# Patient Record
Sex: FEMALE | Race: BLACK OR AFRICAN AMERICAN | NOT HISPANIC OR LATINO | Employment: UNEMPLOYED | URBAN - METROPOLITAN AREA
[De-identification: names, ages, dates, MRNs, and addresses within clinical notes are randomized per-mention and may not be internally consistent; named-entity substitution may affect disease eponyms.]

---

## 2021-12-15 ENCOUNTER — LAB VISIT (OUTPATIENT)
Dept: LAB | Facility: CLINIC | Age: 21
End: 2021-12-15
Payer: MEDICAID

## 2021-12-15 ENCOUNTER — INITIAL PRENATAL (OUTPATIENT)
Dept: OBSTETRICS AND GYNECOLOGY | Facility: CLINIC | Age: 21
End: 2021-12-15
Payer: MEDICAID

## 2021-12-15 ENCOUNTER — PROCEDURE VISIT (OUTPATIENT)
Dept: OBSTETRICS AND GYNECOLOGY | Facility: CLINIC | Age: 21
End: 2021-12-15
Payer: MEDICAID

## 2021-12-15 VITALS — WEIGHT: 268.94 LBS | DIASTOLIC BLOOD PRESSURE: 78 MMHG | SYSTOLIC BLOOD PRESSURE: 142 MMHG

## 2021-12-15 DIAGNOSIS — R63.8 INCREASED BMI: ICD-10-CM

## 2021-12-15 DIAGNOSIS — O09.32 INSUFFICIENT PRENATAL CARE IN SECOND TRIMESTER: ICD-10-CM

## 2021-12-15 DIAGNOSIS — Z32.01 POSITIVE PREGNANCY TEST: ICD-10-CM

## 2021-12-15 DIAGNOSIS — Z32.01 POSITIVE PREGNANCY TEST: Primary | ICD-10-CM

## 2021-12-15 DIAGNOSIS — Z3A.15 15 WEEKS GESTATION OF PREGNANCY: ICD-10-CM

## 2021-12-15 LAB
ABO + RH BLD: NORMAL
AMPHET+METHAMPHET UR QL: NEGATIVE
BARBITURATES UR QL SCN>200 NG/ML: NEGATIVE
BASOPHILS # BLD AUTO: 0.01 K/UL (ref 0–0.2)
BASOPHILS NFR BLD: 0.1 % (ref 0–1.9)
BENZODIAZ UR QL SCN>200 NG/ML: NEGATIVE
BLD GP AB SCN CELLS X3 SERPL QL: NORMAL
BZE UR QL SCN: NEGATIVE
CANNABINOIDS UR QL SCN: NEGATIVE
CREAT UR-MCNC: 224.8 MG/DL (ref 15–325)
DIFFERENTIAL METHOD: ABNORMAL
EOSINOPHIL # BLD AUTO: 0.1 K/UL (ref 0–0.5)
EOSINOPHIL NFR BLD: 1.3 % (ref 0–8)
ERYTHROCYTE [DISTWIDTH] IN BLOOD BY AUTOMATED COUNT: 14.2 % (ref 11.5–14.5)
HCT VFR BLD AUTO: 32.5 % (ref 37–48.5)
HGB BLD-MCNC: 10.8 G/DL (ref 12–16)
HGB S BLD QL SOLY: NEGATIVE
IMM GRANULOCYTES # BLD AUTO: 0.02 K/UL (ref 0–0.04)
IMM GRANULOCYTES NFR BLD AUTO: 0.3 % (ref 0–0.5)
LYMPHOCYTES # BLD AUTO: 2.7 K/UL (ref 1–4.8)
LYMPHOCYTES NFR BLD: 34.6 % (ref 18–48)
MCH RBC QN AUTO: 25.4 PG (ref 27–31)
MCHC RBC AUTO-ENTMCNC: 33.2 G/DL (ref 32–36)
MCV RBC AUTO: 77 FL (ref 82–98)
METHADONE UR QL SCN>300 NG/ML: NEGATIVE
MONOCYTES # BLD AUTO: 0.6 K/UL (ref 0.3–1)
MONOCYTES NFR BLD: 7.8 % (ref 4–15)
NEUTROPHILS # BLD AUTO: 4.4 K/UL (ref 1.8–7.7)
NEUTROPHILS NFR BLD: 55.9 % (ref 38–73)
NRBC BLD-RTO: 0 /100 WBC
OPIATES UR QL SCN: NEGATIVE
PCP UR QL SCN>25 NG/ML: NEGATIVE
PLATELET # BLD AUTO: 246 K/UL (ref 150–450)
PMV BLD AUTO: 8.9 FL (ref 9.2–12.9)
RBC # BLD AUTO: 4.25 M/UL (ref 4–5.4)
TOXICOLOGY INFORMATION: NORMAL
WBC # BLD AUTO: 7.85 K/UL (ref 3.9–12.7)

## 2021-12-15 PROCEDURE — 86592 SYPHILIS TEST NON-TREP QUAL: CPT | Performed by: ADVANCED PRACTICE MIDWIFE

## 2021-12-15 PROCEDURE — 99203 PR OFFICE/OUTPT VISIT, NEW, LEVL III, 30-44 MIN: ICD-10-PCS | Mod: TH,25,S$GLB, | Performed by: ADVANCED PRACTICE MIDWIFE

## 2021-12-15 PROCEDURE — 80307 DRUG TEST PRSMV CHEM ANLYZR: CPT | Performed by: ADVANCED PRACTICE MIDWIFE

## 2021-12-15 PROCEDURE — 76805 OB US >/= 14 WKS SNGL FETUS: CPT | Mod: S$GLB,,, | Performed by: OBSTETRICS & GYNECOLOGY

## 2021-12-15 PROCEDURE — 85025 COMPLETE CBC W/AUTO DIFF WBC: CPT | Performed by: ADVANCED PRACTICE MIDWIFE

## 2021-12-15 PROCEDURE — 76805 US OB/GYN PROCEDURE (VIEWPOINT): ICD-10-PCS | Mod: S$GLB,,, | Performed by: OBSTETRICS & GYNECOLOGY

## 2021-12-15 PROCEDURE — 36415 PR COLLECTION VENOUS BLOOD,VENIPUNCTURE: ICD-10-PCS | Mod: ,,, | Performed by: ADVANCED PRACTICE MIDWIFE

## 2021-12-15 PROCEDURE — 87491 CHLMYD TRACH DNA AMP PROBE: CPT | Performed by: ADVANCED PRACTICE MIDWIFE

## 2021-12-15 PROCEDURE — 99203 OFFICE O/P NEW LOW 30 MIN: CPT | Mod: TH,25,S$GLB, | Performed by: ADVANCED PRACTICE MIDWIFE

## 2021-12-15 PROCEDURE — 87086 URINE CULTURE/COLONY COUNT: CPT | Performed by: ADVANCED PRACTICE MIDWIFE

## 2021-12-15 PROCEDURE — 87591 N.GONORRHOEAE DNA AMP PROB: CPT | Performed by: ADVANCED PRACTICE MIDWIFE

## 2021-12-15 PROCEDURE — 86762 RUBELLA ANTIBODY: CPT | Performed by: ADVANCED PRACTICE MIDWIFE

## 2021-12-15 PROCEDURE — 80074 ACUTE HEPATITIS PANEL: CPT | Performed by: ADVANCED PRACTICE MIDWIFE

## 2021-12-15 PROCEDURE — 86901 BLOOD TYPING SEROLOGIC RH(D): CPT | Performed by: ADVANCED PRACTICE MIDWIFE

## 2021-12-15 PROCEDURE — 87389 HIV-1 AG W/HIV-1&-2 AB AG IA: CPT | Performed by: ADVANCED PRACTICE MIDWIFE

## 2021-12-15 PROCEDURE — 36415 COLL VENOUS BLD VENIPUNCTURE: CPT | Mod: ,,, | Performed by: ADVANCED PRACTICE MIDWIFE

## 2021-12-15 PROCEDURE — 85660 RBC SICKLE CELL TEST: CPT | Performed by: ADVANCED PRACTICE MIDWIFE

## 2021-12-16 LAB
HAV IGM SERPL QL IA: NEGATIVE
HBV CORE IGM SERPL QL IA: NEGATIVE
HBV SURFACE AG SERPL QL IA: NEGATIVE
HCV AB SERPL QL IA: NEGATIVE
HIV 1+2 AB+HIV1 P24 AG SERPL QL IA: NEGATIVE
RPR SER QL: NORMAL
RUBV IGG SER-ACNC: 26.1 IU/ML
RUBV IGG SER-IMP: REACTIVE

## 2021-12-17 LAB — BACTERIA UR CULT: NO GROWTH

## 2021-12-22 LAB
C TRACH DNA SPEC QL NAA+PROBE: NOT DETECTED
N GONORRHOEA DNA SPEC QL NAA+PROBE: NOT DETECTED

## 2022-01-04 ENCOUNTER — ROUTINE PRENATAL (OUTPATIENT)
Dept: OBSTETRICS AND GYNECOLOGY | Facility: CLINIC | Age: 22
End: 2022-01-04
Payer: MEDICAID

## 2022-01-04 VITALS — SYSTOLIC BLOOD PRESSURE: 140 MMHG | DIASTOLIC BLOOD PRESSURE: 78 MMHG | WEIGHT: 265.88 LBS

## 2022-01-04 DIAGNOSIS — Z3A.18 18 WEEKS GESTATION OF PREGNANCY: Primary | ICD-10-CM

## 2022-01-04 DIAGNOSIS — O09.32 INSUFFICIENT PRENATAL CARE IN SECOND TRIMESTER: ICD-10-CM

## 2022-01-04 PROBLEM — Z3A.15 15 WEEKS GESTATION OF PREGNANCY: Status: RESOLVED | Noted: 2021-12-15 | Resolved: 2022-01-04

## 2022-01-04 PROCEDURE — 99213 OFFICE O/P EST LOW 20 MIN: CPT | Mod: TH,S$GLB,, | Performed by: ADVANCED PRACTICE MIDWIFE

## 2022-01-04 PROCEDURE — 99213 PR OFFICE/OUTPT VISIT, EST, LEVL III, 20-29 MIN: ICD-10-PCS | Mod: TH,S$GLB,, | Performed by: ADVANCED PRACTICE MIDWIFE

## 2022-01-04 NOTE — PROGRESS NOTES
2022  21 y.o. 18w2d  Estimated Date of Delivery: 22, EDC per patient (US at [facility]). US requested, not received. US in our office at 15 + 3/7 weeks c/w stated EDC.  OB History    Para Term  AB Living   1             SAB IAB Ectopic Multiple Live Births                  # Outcome Date GA Lbr Ron/2nd Weight Sex Delivery Anes PTL Lv   1 Current                Here for scheduled KURTIS visit. Doing well.  No lof/brvb, dysuria, fever/chills, nausea or emesis. No S&S of pre eclampsia or COIVD 19. Denies quickening. No cramps/contractions. Calm, pleasant, NAD. ROS negative with exception of aforementioned:     History  OBXHX:  Insufficient prenatal care  Increased BMI  PMHX:  Denies major illness injury  SURGHX:  Denies  NKDA  MEDS:  None  SOCHX:  Former smoker-quit first trimester  Denies etoh or tobacco use in pregnancy.       LABS:  B pos abs neg  HIV neg  Rubella immune  RPR non reactive  HEP A B C neg  GC CHL neg/neg  Urine cx no growth  CBC 10.8/32.5  Sickle Screen neg  PAP-plan PP      Review of Systems:  General ROS: negative for headache or visual changes  Breast ROS: negative for breast lumps  Gastrointestinal ROS: negative for constipation, diarrhea or nausea/vomiting  Musculoskeletal ROS: negative for pain in joints or swelling in face or hands.   Neurological ROS: negative for - headaches, numbness/tingling or visual changes    Physical Exam:  BP (!) 140/78   Wt 120.6 kg (265 lb 14 oz)   LMP 2021   Urine Dip:   FHT:   160s  Constitutional: She is oriented to person, place, and time. She appears well-developed and well-nourished. No distress.   Pulmonary/Chest: Effort normal. No respiratory distress  Abdominal: Soft, gravid, nontender. No rebound and no guarding. Fundal Height:  1 below U, cwd.   Genitourinary: Deferred   Musculoskeletal: Normal range of motion. Minimal peripheral edema.   Neurological: She is alert and oriented to person, place, and time.  Coordination normal. Gait smooth and steady  Skin: Skin is warm and dry. She is not diaphoretic.  Psychiatric: She has a normal mood and affect.      Assessment:   21 y.o., at 18w2d Gestation   Patient Active Problem List   Diagnosis    Insufficient prenatal care in second trimester    Increased BMI    18 weeks gestation of pregnancy     No current outpatient medications on file prior to visit.     No current facility-administered medications on file prior to visit.       Plan:  1. S&S of SAB reinforced.  2. RX for PNV sent to pharmacy of choice. Discussed may take OTC PNV if not covered by insurance provider.  3. All prenatal labs reviewed and discussed.  4. Dating US discussed, cwd. Keep EDC 6/05/22.  5. Quad Screen at next visit, discussed. No  here at office today.  6. Anatomy US and KURTIS in 2 weeks.

## 2022-01-21 ENCOUNTER — LAB VISIT (OUTPATIENT)
Dept: LAB | Facility: CLINIC | Age: 22
End: 2022-01-21
Payer: MEDICAID

## 2022-01-21 ENCOUNTER — ROUTINE PRENATAL (OUTPATIENT)
Dept: OBSTETRICS AND GYNECOLOGY | Facility: CLINIC | Age: 22
End: 2022-01-21
Payer: MEDICAID

## 2022-01-21 ENCOUNTER — PROCEDURE VISIT (OUTPATIENT)
Dept: OBSTETRICS AND GYNECOLOGY | Facility: CLINIC | Age: 22
End: 2022-01-21
Payer: MEDICAID

## 2022-01-21 VITALS — DIASTOLIC BLOOD PRESSURE: 68 MMHG | WEIGHT: 265.88 LBS | SYSTOLIC BLOOD PRESSURE: 120 MMHG

## 2022-01-21 DIAGNOSIS — R63.8 INCREASED BMI: ICD-10-CM

## 2022-01-21 DIAGNOSIS — O09.32 INSUFFICIENT PRENATAL CARE IN SECOND TRIMESTER: Primary | ICD-10-CM

## 2022-01-21 DIAGNOSIS — Z3A.20 20 WEEKS GESTATION OF PREGNANCY: ICD-10-CM

## 2022-01-21 PROBLEM — Z3A.18 18 WEEKS GESTATION OF PREGNANCY: Status: RESOLVED | Noted: 2022-01-04 | Resolved: 2022-01-21

## 2022-01-21 PROCEDURE — 76805 OB US >/= 14 WKS SNGL FETUS: CPT | Mod: S$GLB,,, | Performed by: OBSTETRICS & GYNECOLOGY

## 2022-01-21 PROCEDURE — 36415 PR COLLECTION VENOUS BLOOD,VENIPUNCTURE: ICD-10-PCS | Mod: ,,, | Performed by: ADVANCED PRACTICE MIDWIFE

## 2022-01-21 PROCEDURE — 36415 COLL VENOUS BLD VENIPUNCTURE: CPT | Mod: ,,, | Performed by: ADVANCED PRACTICE MIDWIFE

## 2022-01-21 PROCEDURE — 99213 OFFICE O/P EST LOW 20 MIN: CPT | Mod: TH,S$GLB,, | Performed by: ADVANCED PRACTICE MIDWIFE

## 2022-01-21 PROCEDURE — 99213 PR OFFICE/OUTPT VISIT, EST, LEVL III, 20-29 MIN: ICD-10-PCS | Mod: TH,S$GLB,, | Performed by: ADVANCED PRACTICE MIDWIFE

## 2022-01-21 PROCEDURE — 76805 US OB/GYN PROCEDURE (VIEWPOINT): ICD-10-PCS | Mod: S$GLB,,, | Performed by: OBSTETRICS & GYNECOLOGY

## 2022-01-21 PROCEDURE — 81511 FTL CGEN ABNOR FOUR ANAL: CPT | Performed by: ADVANCED PRACTICE MIDWIFE

## 2022-01-21 NOTE — PROGRESS NOTES
2022  21 y.o. 20 + 5/7  Estimated Date of Delivery: 22, EDC per patient (US at [facility]). US requested, not received. US in our office at 15 + 3/7 weeks c/w stated EDC.  OB History    Para Term  AB Living   1             SAB IAB Ectopic Multiple Live Births                  # Outcome Date GA Lbr Ron/2nd Weight Sex Delivery Anes PTL Lv   1 Current                Here for scheduled KURTIS visit. Doing well.  No lof/brvb, dysuria, fever/chills, nausea or emesis. No S&S of  COIVD 19. + FM noted. No cramps/contractions. Calm, pleasant, NAD. ROS negative with exception of aforementioned:     History  OBXHX:  Insufficient prenatal care  Increased BMI  PMHX:  Denies major illness injury  SURGHX:  Denies  NKDA  MEDS:  None  SOCHX:  Former smoker-quit first trimester  Denies etoh or tobacco use in pregnancy.     LABS:  B pos abs neg  HIV neg  Rubella immune  RPR non reactive  HEP A B C neg  GC CHL neg/neg  Urine cx no growth  CBC 10.8/32.5  Sickle Screen neg  PAP-plan PP      Review of Systems:  General ROS: negative for headache or visual changes  Breast ROS: negative for breast lumps  Gastrointestinal ROS: negative for constipation, diarrhea or nausea/vomiting  Musculoskeletal ROS: negative for pain in joints or swelling in face or hands.   Neurological ROS: negative for - headaches, numbness/tingling or visual changes    Physical Exam:  /68   Wt 120.6 kg (265 lb 14 oz)   LMP 2021   Urine Dip: neg/neg  FHT:   150s-160s  Constitutional: She is oriented to person, place, and time. She appears well-developed and well-nourished. No distress.   Pulmonary/Chest: Effort normal. No respiratory distress  Abdominal: Soft, gravid, nontender. No rebound and no guarding. Fundal Height:  1 above U.  Genitourinary: Deferred   Musculoskeletal: Normal range of motion. Minimal peripheral edema.   Neurological: She is alert and oriented to person, place, and time. Coordination normal. Gait  smooth and steady  Skin: Skin is warm and dry. She is not diaphoretic.  Psychiatric: She has a normal mood and affect.      Assessment:   21 y.o., at 20 + 5/7 Gestation   Patient Active Problem List   Diagnosis    Insufficient prenatal care in second trimester    Increased BMI    20 weeks gestation of pregnancy     Current Outpatient Medications on File Prior to Visit   Medication Sig Dispense Refill    PNV,calcium 72-iron-folic acid (PRENATAL VITAMIN PLUS LOW IRON) 27 mg iron- 1 mg Tab Take 1 tablet (1 each total) by mouth once daily.  3     No current facility-administered medications on file prior to visit.       Plan:  1. S&S of SAB reinforced.  2. Continue daily PNV.  3.   Quad Screen today/discussed.  4.  US for anatomy today.  5.  KURTIS 3 weeks.

## 2022-01-24 LAB
# FETUSES US: NORMAL
2ND TRIMESTER 4 SCREEN PNL SERPL: NEGATIVE
2ND TRIMESTER 4 SCREEN SERPL-IMP: NORMAL
AFP MOM SERPL: 1.54
AFP SERPL-MCNC: 81.5 NG/ML
AGE AT DELIVERY: 23
B-HCG MOM SERPL: 1.29
B-HCG SERPL-ACNC: 22.9 IU/ML
FET TS 21 RISK FROM MAT AGE: NORMAL
GA (DAYS): 5 D
GA (WEEKS): 20 WK
GA METHOD: NORMAL
IDDM PATIENT QL: NORMAL
INHIBIN A MOM SERPL: 1.19
INHIBIN A SERPL-MCNC: 147.1 PG/ML
SMOKING STATUS FTND: NO
TS 18 RISK FETUS: NORMAL
TS 21 RISK FETUS: NORMAL
U ESTRIOL MOM SERPL: 0.51
U ESTRIOL SERPL-MCNC: 0.99 NG/ML

## 2022-02-11 ENCOUNTER — ROUTINE PRENATAL (OUTPATIENT)
Dept: OBSTETRICS AND GYNECOLOGY | Facility: CLINIC | Age: 22
End: 2022-02-11
Payer: MEDICAID

## 2022-02-11 VITALS — SYSTOLIC BLOOD PRESSURE: 130 MMHG | WEIGHT: 266.13 LBS | DIASTOLIC BLOOD PRESSURE: 64 MMHG

## 2022-02-11 DIAGNOSIS — Z3A.23 23 WEEKS GESTATION OF PREGNANCY: Primary | ICD-10-CM

## 2022-02-11 DIAGNOSIS — Z3A.26 26 WEEKS GESTATION OF PREGNANCY: ICD-10-CM

## 2022-02-11 PROCEDURE — 59425 PR ANTEPARTUM CARE ONLY, 4-6 VISITS: ICD-10-PCS | Mod: TH,S$GLB,,

## 2022-02-11 PROCEDURE — 59425 ANTEPARTUM CARE ONLY: CPT | Mod: TH,S$GLB,,

## 2022-02-11 NOTE — PROGRESS NOTES
SUBJECTIVE:       Polly Bradshaw is a 22 y.o. female  at 23w5d presenting for a routine pregnancy visit.  Patient reports good fetal movements. She denies cramping, contractions, vaginal bleeding and leaking.  She is taking prenatal vitamins.    Estimated Date of Delivery: 2022, by Last Menstrual Period, dating reviewed.  Prenatal labs reviewed and updated today.    Review of Systems   Constitutional: Negative for activity change, appetite change, chills, fatigue, fever and unexpected weight change.   HENT: Negative for nasal congestion, dental problem, ear pain, postnasal drip, rhinorrhea, sinus pressure/congestion, sneezing and sore throat.    Eyes: Negative for discharge, visual disturbance and eye dryness.   Respiratory: Negative for cough, chest tightness and shortness of breath.    Cardiovascular: Negative for chest pain, palpitations and leg swelling.   Gastrointestinal: Negative for abdominal distention, abdominal pain, change in bowel habit, constipation, diarrhea, nausea, vomiting, reflux and change in bowel habit.   Endocrine: Negative for cold intolerance, heat intolerance, polydipsia and polyuria.   Genitourinary: Negative for difficulty urinating, dyspareunia, dysuria, flank pain, frequency, urgency, cramps, contractions, vaginal bleeding, vaginal discharge and vaginal pain.  Musculoskeletal: Negative for back pain and myalgias.   Integumentary:  Negative for rash, breast mass, breast discharge and breast tenderness.  Allergic/Immunologic: Negative for immunocompromised state.   Neurological: Negative for dizziness, syncope, weakness, light-headedness, numbness, headaches, disturbances in coordination and coordination difficulties.   Hematological: Does not bruise/bleed easily.   Psychiatric/Behavioral: Negative for decreased concentration and sleep disturbance. The patient is not nervous/anxious.      OBJECTIVE:      /64   Wt 120.7 kg (266 lb 1.5 oz)   LMP 2021     Physical  Exam  Constitutional: She is oriented to person, place, and time. Vital signs are normal. She appears well-developed and well-nourished.    Head: Normocephalic and atraumatic.      Cardiovascular: Normal rate and regular rhythm.  Pulmonary/Chest: Effort normal.      Abdominal: Soft. Gravid. There is no abdominal tenderness or distension.    Genitourinary: Deferred  Musculoskeletal: Moves all extremeties.       Neurological: She is alert and oriented to person, place, and time. GCS eye subscore is 4. GCS verbal subscore is 5. GCS motor subscore is 6.    Skin: Skin is warm and dry. Capillary refill takes less than 2 seconds.    Psychiatric: Her speech is normal and behavior is normal. Mood, affect and thought content normal.      ASSESSMENT:       1. 23 weeks gestation of pregnancy          PLAN:   23 weeks gestation of pregnancy        Kick counts, bleeding, pain, and labor precautions reviewed.  Follow-up in 3 weeks for GTT, CBC, KURTIS with ROSA Krueger CNM.

## 2022-03-11 ENCOUNTER — LAB VISIT (OUTPATIENT)
Dept: LAB | Facility: CLINIC | Age: 22
End: 2022-03-11
Payer: MEDICAID

## 2022-03-11 ENCOUNTER — ROUTINE PRENATAL (OUTPATIENT)
Dept: OBSTETRICS AND GYNECOLOGY | Facility: CLINIC | Age: 22
End: 2022-03-11
Payer: MEDICAID

## 2022-03-11 VITALS — DIASTOLIC BLOOD PRESSURE: 73 MMHG | SYSTOLIC BLOOD PRESSURE: 133 MMHG | WEIGHT: 266.88 LBS

## 2022-03-11 DIAGNOSIS — O09.32 INSUFFICIENT PRENATAL CARE IN SECOND TRIMESTER: Primary | ICD-10-CM

## 2022-03-11 DIAGNOSIS — R73.09 ABNORMAL ORAL GLUCOSE TOLERANCE TEST: Primary | ICD-10-CM

## 2022-03-11 DIAGNOSIS — R63.8 INCREASED BMI: ICD-10-CM

## 2022-03-11 DIAGNOSIS — Z3A.26 26 WEEKS GESTATION OF PREGNANCY: ICD-10-CM

## 2022-03-11 DIAGNOSIS — Z3A.27 27 WEEKS GESTATION OF PREGNANCY: ICD-10-CM

## 2022-03-11 PROBLEM — Z3A.20 20 WEEKS GESTATION OF PREGNANCY: Status: RESOLVED | Noted: 2022-01-21 | Resolved: 2022-03-11

## 2022-03-11 LAB
BASOPHILS # BLD AUTO: 0.02 K/UL (ref 0–0.2)
BASOPHILS NFR BLD: 0.2 % (ref 0–1.9)
DIFFERENTIAL METHOD: ABNORMAL
EOSINOPHIL # BLD AUTO: 0.1 K/UL (ref 0–0.5)
EOSINOPHIL NFR BLD: 1.3 % (ref 0–8)
ERYTHROCYTE [DISTWIDTH] IN BLOOD BY AUTOMATED COUNT: 13.7 % (ref 11.5–14.5)
GLUCOSE SERPL-MCNC: 155 MG/DL (ref 70–140)
HCT VFR BLD AUTO: 33.5 % (ref 37–48.5)
HGB BLD-MCNC: 10.9 G/DL (ref 12–16)
IMM GRANULOCYTES # BLD AUTO: 0.02 K/UL (ref 0–0.04)
IMM GRANULOCYTES NFR BLD AUTO: 0.2 % (ref 0–0.5)
LYMPHOCYTES # BLD AUTO: 2.6 K/UL (ref 1–4.8)
LYMPHOCYTES NFR BLD: 29.3 % (ref 18–48)
MCH RBC QN AUTO: 24.9 PG (ref 27–31)
MCHC RBC AUTO-ENTMCNC: 32.5 G/DL (ref 32–36)
MCV RBC AUTO: 77 FL (ref 82–98)
MONOCYTES # BLD AUTO: 0.5 K/UL (ref 0.3–1)
MONOCYTES NFR BLD: 6 % (ref 4–15)
NEUTROPHILS # BLD AUTO: 5.6 K/UL (ref 1.8–7.7)
NEUTROPHILS NFR BLD: 63 % (ref 38–73)
NRBC BLD-RTO: 0 /100 WBC
PLATELET # BLD AUTO: 261 K/UL (ref 150–450)
PMV BLD AUTO: 9 FL (ref 9.2–12.9)
RBC # BLD AUTO: 4.38 M/UL (ref 4–5.4)
WBC # BLD AUTO: 8.9 K/UL (ref 3.9–12.7)

## 2022-03-11 PROCEDURE — 36415 COLL VENOUS BLD VENIPUNCTURE: CPT | Mod: ,,,

## 2022-03-11 PROCEDURE — 85025 COMPLETE CBC W/AUTO DIFF WBC: CPT

## 2022-03-11 PROCEDURE — 36415 PR COLLECTION VENOUS BLOOD,VENIPUNCTURE: ICD-10-PCS | Mod: ,,,

## 2022-03-11 PROCEDURE — 82950 GLUCOSE TEST: CPT

## 2022-03-11 PROCEDURE — 59425 ANTEPARTUM CARE ONLY: CPT | Mod: TH,S$GLB,, | Performed by: ADVANCED PRACTICE MIDWIFE

## 2022-03-11 PROCEDURE — 59425 PR ANTEPARTUM CARE ONLY, 4-6 VISITS: ICD-10-PCS | Mod: TH,S$GLB,, | Performed by: ADVANCED PRACTICE MIDWIFE

## 2022-03-11 NOTE — PROGRESS NOTES
Please notify patient that her labs show she is still anemic however, her blood counts have improved.  Please ensure she is taking a prenatal with iron and an OTC iron supplement daily.  Her glucose screen result is elevated.  She needs a 3-hr GTT to test for GDM.  Orders placed.  Please schedule. Thank you.

## 2022-03-11 NOTE — PROGRESS NOTES
3/11/2022  22 y.o.  27 + 5/7  Estimated Date of Delivery: 22, EDC per patient (US at [facility]). US requested, not received. US in our office at 15 + 3/7 weeks c/w stated EDC.  OB History    Para Term  AB Living   1             SAB IAB Ectopic Multiple Live Births                  # Outcome Date GA Lbr Ron/2nd Weight Sex Delivery Anes PTL Lv   1 Current                Here for scheduled KURTIS visit. Doing well.  No lof/brvb, dysuria, fever/chills, nausea or emesis. No S&S of  COIVD 19 or pre eclampsia. Good FM noted. No cramps/contractions.  Calm, pleasant, NAD. ROS negative with exception of aforementioned:     History  OBXHX:  Insufficient prenatal care  Increased BMI  PMHX:  Denies major illness injury  SURGHX:  Denies  NKDA  MEDS:  None  SOCHX:  Former smoker-quit first trimester  Denies etoh or tobacco use in pregnancy.     LABS:  B pos abs neg  HIV neg  Rubella immune  RPR non reactive  HEP A B C neg  GC CHL neg/neg  Urine cx no growth  CBC 10.8/32.5  Sickle Screen neg  PAP-plan PP  Quad Screen-low risk x 3.    Review of Systems:  General ROS: negative for headache or visual changes  Breast ROS: negative for breast lumps  Gastrointestinal ROS: negative for constipation, diarrhea or nausea/vomiting  Musculoskeletal ROS: negative for pain in joints or swelling in face or hands.   Neurological ROS: negative for - headaches, numbness/tingling or visual changes    Physical Exam:  /73   Wt 121.1 kg (266 lb 14.4 oz)   LMP 2021   Urine Dip: +1/neg  FHT: Fetal Heart Rate: 150s   Constitutional: She is oriented to person, place, and time. She appears well-developed and well-nourished. No distress.   Pulmonary/Chest: Effort normal. No respiratory distress  Abdominal: Soft, gravid, nontender. No rebound and no guarding. Fundal Height: Fundal Height (cm): 29 cm  Genitourinary: Deferred   Musculoskeletal: Normal range of motion. Minimal peripheral edema.    Neurological: She is alert and oriented to person, place, and time. Coordination normal. Gait smooth and steady  Skin: Skin is warm and dry. She is not diaphoretic.  Psychiatric: She has a normal mood and affect.      Assessment:   22 y.o., at 27 + 5/7 Gestation   Patient Active Problem List   Diagnosis    Insufficient prenatal care in second trimester    Increased BMI    27 weeks gestation of pregnancy     Current Outpatient Medications on File Prior to Visit   Medication Sig Dispense Refill    PNV,calcium 72-iron-folic acid (PRENATAL VITAMIN PLUS LOW IRON) 27 mg iron- 1 mg Tab Take 1 tablet (1 each total) by mouth once daily. (Patient not taking: Reported on 2/11/2022)  3     No current facility-administered medications on file prior to visit.       Plan:  1. S&S of PTL/PPROM and pre eclampisa discussed. FMC discussed. Begin at 28 weeks.   2. Continue daily PNV.  3.   Quad Screen low risk x 3 discussed.  4.  US for anatomy at 20 + 5/7 weeks, normal, no previa, CWD.  5.  1 hour gtt/CBC today.   6. Growth US when possible.  7. KURTIS with me 2 weeks.

## 2022-03-15 ENCOUNTER — TELEPHONE (OUTPATIENT)
Dept: OBSTETRICS AND GYNECOLOGY | Facility: CLINIC | Age: 22
End: 2022-03-15
Payer: MEDICAID

## 2022-03-16 RX ORDER — FERROUS SULFATE 325(65) MG
325 TABLET, DELAYED RELEASE (ENTERIC COATED) ORAL 2 TIMES DAILY
Qty: 90 TABLET | Refills: 2 | Status: SHIPPED | OUTPATIENT
Start: 2022-03-16

## 2022-03-16 NOTE — PROGRESS NOTES
Please let patient know I sent RX to her pharmacy for PNV and Iron supplement. If they are not covered by her insurance company, she will need to get OTC. Also ensure we have her scheduled or she has performed 3 hour gtt.

## 2022-03-24 ENCOUNTER — PROCEDURE VISIT (OUTPATIENT)
Dept: OBSTETRICS AND GYNECOLOGY | Facility: CLINIC | Age: 22
End: 2022-03-24
Payer: MEDICAID

## 2022-03-24 ENCOUNTER — LAB VISIT (OUTPATIENT)
Dept: LAB | Facility: CLINIC | Age: 22
End: 2022-03-24
Payer: MEDICAID

## 2022-03-24 DIAGNOSIS — Z3A.27 27 WEEKS GESTATION OF PREGNANCY: ICD-10-CM

## 2022-03-24 DIAGNOSIS — R73.09 ABNORMAL ORAL GLUCOSE TOLERANCE TEST: ICD-10-CM

## 2022-03-24 LAB
GLUCOSE SERPL-MCNC: 100 MG/DL
GLUCOSE SERPL-MCNC: 117 MG/DL
GLUCOSE SERPL-MCNC: 70 MG/DL (ref 70–110)
GLUCOSE SERPL-MCNC: 96 MG/DL

## 2022-03-24 PROCEDURE — 82951 GLUCOSE TOLERANCE TEST (GTT): CPT

## 2022-03-24 PROCEDURE — 36415 COLL VENOUS BLD VENIPUNCTURE: CPT | Mod: ,,,

## 2022-03-24 PROCEDURE — 76816 OB US FOLLOW-UP PER FETUS: CPT | Mod: S$GLB,,, | Performed by: OBSTETRICS & GYNECOLOGY

## 2022-03-24 PROCEDURE — 36415 PR COLLECTION VENOUS BLOOD,VENIPUNCTURE: ICD-10-PCS | Mod: ,,,

## 2022-03-24 PROCEDURE — 76816 US OB/GYN PROCEDURE (VIEWPOINT): ICD-10-PCS | Mod: S$GLB,,, | Performed by: OBSTETRICS & GYNECOLOGY

## 2022-03-31 ENCOUNTER — TELEPHONE (OUTPATIENT)
Dept: OBSTETRICS AND GYNECOLOGY | Facility: CLINIC | Age: 22
End: 2022-03-31

## 2022-03-31 NOTE — TELEPHONE ENCOUNTER
Spoke with patient about her appt     ----- Message from Manisha Dela Cruz sent at 3/31/2022 12:43 PM CDT -----  Contact: pt  Please call pt to schedule her ultra sound has appt in April can do same day    [phone]

## 2022-04-14 ENCOUNTER — PROCEDURE VISIT (OUTPATIENT)
Dept: OBSTETRICS AND GYNECOLOGY | Facility: CLINIC | Age: 22
End: 2022-04-14
Payer: MEDICAID

## 2022-04-14 ENCOUNTER — ROUTINE PRENATAL (OUTPATIENT)
Dept: OBSTETRICS AND GYNECOLOGY | Facility: CLINIC | Age: 22
End: 2022-04-14
Payer: MEDICAID

## 2022-04-14 VITALS — WEIGHT: 270 LBS | DIASTOLIC BLOOD PRESSURE: 63 MMHG | HEART RATE: 104 BPM | SYSTOLIC BLOOD PRESSURE: 130 MMHG

## 2022-04-14 DIAGNOSIS — Z3A.32 32 WEEKS GESTATION OF PREGNANCY: ICD-10-CM

## 2022-04-14 DIAGNOSIS — R73.09 ABNORMAL ORAL GLUCOSE TOLERANCE TEST: ICD-10-CM

## 2022-04-14 DIAGNOSIS — O99.019 ANTEPARTUM ANEMIA: ICD-10-CM

## 2022-04-14 DIAGNOSIS — R63.8 INCREASED BMI: Primary | ICD-10-CM

## 2022-04-14 DIAGNOSIS — O09.32 INSUFFICIENT PRENATAL CARE IN SECOND TRIMESTER: ICD-10-CM

## 2022-04-14 DIAGNOSIS — Z3A.18 18 WEEKS GESTATION OF PREGNANCY: ICD-10-CM

## 2022-04-14 PROBLEM — Z3A.27 27 WEEKS GESTATION OF PREGNANCY: Status: RESOLVED | Noted: 2022-03-11 | Resolved: 2022-04-14

## 2022-04-14 PROCEDURE — 76816 US OB/GYN PROCEDURE (VIEWPOINT): ICD-10-PCS | Mod: S$GLB,,, | Performed by: OBSTETRICS & GYNECOLOGY

## 2022-04-14 PROCEDURE — 59425 PR ANTEPARTUM CARE ONLY, 4-6 VISITS: ICD-10-PCS | Mod: TH,S$GLB,, | Performed by: ADVANCED PRACTICE MIDWIFE

## 2022-04-14 PROCEDURE — 59425 ANTEPARTUM CARE ONLY: CPT | Mod: TH,S$GLB,, | Performed by: ADVANCED PRACTICE MIDWIFE

## 2022-04-14 PROCEDURE — 76816 OB US FOLLOW-UP PER FETUS: CPT | Mod: S$GLB,,, | Performed by: OBSTETRICS & GYNECOLOGY

## 2022-04-14 NOTE — PROGRESS NOTES
2022  22 y.o.  32 + 4/7 weeks.  Estimated Date of Delivery: 22, EDC per patient (US at [facility]). US requested, not received. US in our office at 15 + 3/7 weeks c/w stated EDC.  OB History    Para Term  AB Living   1             SAB IAB Ectopic Multiple Live Births                  # Outcome Date GA Lbr Ron/2nd Weight Sex Delivery Anes PTL Lv   1 Current                Here for scheduled KURTIS visit. Doing well.  No lof/brvb, dysuria, fever/chills, nausea or emesis. No S&S of  COIVD 19 or pre eclampsia. Good FM noted. No cramps/contractions.  Calm, pleasant, NAD. ROS negative with exception of aforementioned:     History  OBXHX:  Insufficient prenatal care  Increased BMI  PMHX:  Denies major illness injury  SURGHX:  Denies  NKDA  MEDS:  None  SOCHX:  Former smoker-quit first trimester  Denies etoh or tobacco use in pregnancy.     LABS:  B pos abs neg  HIV neg  Rubella immune  RPR non reactive  HEP A B C neg  GC CHL neg/neg  Urine cx no growth  CBC 10.8/32.5  Sickle Screen neg  PAP-plan PP  Quad Screen-low risk x 3.  Elevated 1 hour gtt of 155, normal 3 hour gtt.   CBC 10.9/33.5     Review of Systems:  General ROS: negative for headache or visual changes  Breast ROS: negative for breast lumps  Gastrointestinal ROS: negative for constipation, diarrhea or nausea/vomiting  Musculoskeletal ROS: negative for pain in joints or swelling in face or hands.   Neurological ROS: negative for - headaches, numbness/tingling or visual changes    Physical Exam:  /63   Pulse 104   Wt 122.5 kg (270 lb)   LMP 2021   Urine Dip: Trace/Neg  FHT:   150s  Constitutional: She is oriented to person, place, and time. She appears well-developed and well-nourished. No distress.   Pulmonary/Chest: Effort normal. No respiratory distress  Abdominal: Soft, gravid, nontender. No rebound and no guarding. Fundal Height:  32 cm  Genitourinary: Deferred   Musculoskeletal: Normal range  of motion. Minimal peripheral edema.   Neurological: She is alert and oriented to person, place, and time. Coordination normal. Gait smooth and steady  Skin: Skin is warm and dry. She is not diaphoretic.  Psychiatric: She has a normal mood and affect.    Assessment:   22 y.o., at 32 + 4/7 weeks Gestation   Patient Active Problem List   Diagnosis    Insufficient prenatal care in second trimester    Increased BMI    32 weeks gestation of pregnancy    Antepartum anemia     Current Outpatient Medications on File Prior to Visit   Medication Sig Dispense Refill    ferrous sulfate 325 (65 FE) MG EC tablet Take 1 tablet (325 mg total) by mouth 2 (two) times daily. 90 tablet 2    PNV,calcium 72-iron-folic acid (PRENATAL VITAMIN PLUS LOW IRON) 27 mg iron- 1 mg Tab Take 1 tablet (1 each total) by mouth once daily.  3     No current facility-administered medications on file prior to visit.       Plan:  1. S&S of PTL/PPROM and pre eclampisa discussed. FMC discussed.   2. Continue daily PNV and Ferrous Sulfate.  3.   Normal 3 hour gtt result discussed.  4.   Growth US today.  5.   Birth plan discussed. SR GPT for delivery. Breastfeed, open to all pain management options.  6.  KURTIS 2 weeks.

## 2022-05-06 ENCOUNTER — ROUTINE PRENATAL (OUTPATIENT)
Dept: OBSTETRICS AND GYNECOLOGY | Facility: CLINIC | Age: 22
End: 2022-05-06
Payer: MEDICAID

## 2022-05-06 VITALS — WEIGHT: 266.13 LBS | DIASTOLIC BLOOD PRESSURE: 69 MMHG | HEART RATE: 110 BPM | SYSTOLIC BLOOD PRESSURE: 138 MMHG

## 2022-05-06 DIAGNOSIS — R73.09 ABNORMAL ORAL GLUCOSE TOLERANCE TEST: ICD-10-CM

## 2022-05-06 DIAGNOSIS — O26.849 UTERINE SIZE DATE DISCREPANCY PREGNANCY: ICD-10-CM

## 2022-05-06 DIAGNOSIS — O99.019 ANTEPARTUM ANEMIA: ICD-10-CM

## 2022-05-06 DIAGNOSIS — Z3A.35 35 WEEKS GESTATION OF PREGNANCY: Primary | ICD-10-CM

## 2022-05-06 DIAGNOSIS — O99.210 OBESITY AFFECTING PREGNANCY, ANTEPARTUM: ICD-10-CM

## 2022-05-06 DIAGNOSIS — O09.32 INSUFFICIENT PRENATAL CARE IN SECOND TRIMESTER: ICD-10-CM

## 2022-05-06 PROCEDURE — 59426 ANTEPARTUM CARE ONLY: CPT | Mod: TH,S$GLB,, | Performed by: ADVANCED PRACTICE MIDWIFE

## 2022-05-06 PROCEDURE — 59426 PR ANTEPARTUM CARE ONLY, >7 VISITS: ICD-10-PCS | Mod: TH,S$GLB,, | Performed by: ADVANCED PRACTICE MIDWIFE

## 2022-05-06 NOTE — PROGRESS NOTES
2022  22 y.o.  35 + 5/7 weeks.  Estimated Date of Delivery: 22, EDC per patient (US at [facility]). US requested, not received. US in our office at 15 + 3/7 weeks c/w stated EDC.  OB History    Para Term  AB Living   1             SAB IAB Ectopic Multiple Live Births                  # Outcome Date GA Lbr Ron/2nd Weight Sex Delivery Anes PTL Lv   1 Current                Here for scheduled KURTIS visit. Doing well.  No lof/brvb, dysuria, fever/chills, nausea or emesis. No S&S of  COIVD 19 or pre eclampsia. Good FM noted. No cramps/contractions.  Calm, pleasant, NAD. ROS negative with exception of aforementioned:     History  OBXHX:  Insufficient prenatal care  Increased BMI  PMHX:  Denies major illness injury  SURGHX:  Denies  NKDA  MEDS:  None  SOCHX:  Former smoker-quit first trimester  Denies etoh or tobacco use in pregnancy.     LABS:  B pos abs neg  HIV neg  Rubella immune  RPR non reactive  HEP A B C neg  GC CHL neg/neg  Urine cx no growth  CBC 10.8/32.5  Sickle Screen neg  PAP-plan PP  Quad Screen-low risk x 3.  Elevated 1 hour gtt of 155, normal 3 hour gtt.   CBC 10.9/33.5     Review of Systems:  General ROS: negative for headache or visual changes  Breast ROS: negative for breast lumps  Gastrointestinal ROS: negative for constipation, diarrhea or nausea/vomiting  Musculoskeletal ROS: negative for pain in joints or swelling in face or hands.   Neurological ROS: negative for - headaches, numbness/tingling or visual changes    Physical Exam:  /69   Pulse 110   Wt 120.7 kg (266 lb 1.6 oz)   LMP 2021   Urine Dip: Trace/Neg  FHT:   150s  Constitutional: She is oriented to person, place, and time. She appears well-developed and well-nourished. No distress.   Pulmonary/Chest: Effort normal. No respiratory distress  Abdominal: Soft, gravid, nontender. No rebound and no guarding. Fundal Height:  38 cm  Genitourinary: Deferred   Musculoskeletal: Normal  range of motion. Minimal peripheral edema.   Neurological: She is alert and oriented to person, place, and time. Coordination normal. Gait smooth and steady  Skin: Skin is warm and dry. She is not diaphoretic.  Psychiatric: She has a normal mood and affect.    Assessment:   22 y.o., at 35 + 5/7 weeks Gestation   Patient Active Problem List   Diagnosis    Insufficient prenatal care in second trimester    Increased BMI    32 weeks gestation of pregnancy    Antepartum anemia    35 weeks gestation of pregnancy    Abnormal oral glucose tolerance test    Obesity affecting pregnancy, antepartum    Uterine size date discrepancy pregnancy     Current Outpatient Medications on File Prior to Visit   Medication Sig Dispense Refill    ferrous sulfate 325 (65 FE) MG EC tablet Take 1 tablet (325 mg total) by mouth 2 (two) times daily. 90 tablet 2    PNV,calcium 72-iron-folic acid (PRENATAL VITAMIN PLUS LOW IRON) 27 mg iron- 1 mg Tab Take 1 tablet (1 each total) by mouth once daily.  3     No current facility-administered medications on file prior to visit.       Plan:  1. S&S of PTL/PPROM and pre eclampisa discussed. FMC discussed.   2. Continue daily PNV and Ferrous Sulfate.  3.   Birth plan discussed previously. SR GPT for delivery. Breastfeed, open to all pain management options.  4.  Weekly KURTIS until delivery. Growth US in 2 weeks.

## 2022-05-13 ENCOUNTER — ROUTINE PRENATAL (OUTPATIENT)
Dept: OBSTETRICS AND GYNECOLOGY | Facility: CLINIC | Age: 22
End: 2022-05-13
Payer: MEDICAID

## 2022-05-13 VITALS — HEART RATE: 103 BPM | WEIGHT: 265.88 LBS | SYSTOLIC BLOOD PRESSURE: 121 MMHG | DIASTOLIC BLOOD PRESSURE: 83 MMHG

## 2022-05-13 DIAGNOSIS — Z3A.36 36 WEEKS GESTATION OF PREGNANCY: Primary | ICD-10-CM

## 2022-05-13 PROBLEM — Z3A.35 35 WEEKS GESTATION OF PREGNANCY: Status: RESOLVED | Noted: 2022-05-06 | Resolved: 2022-05-13

## 2022-05-13 PROBLEM — Z3A.32 32 WEEKS GESTATION OF PREGNANCY: Status: RESOLVED | Noted: 2022-04-14 | Resolved: 2022-05-13

## 2022-05-13 PROCEDURE — 87147 CULTURE TYPE IMMUNOLOGIC: CPT | Performed by: ADVANCED PRACTICE MIDWIFE

## 2022-05-13 PROCEDURE — 59426 ANTEPARTUM CARE ONLY: CPT | Mod: TH,S$GLB,, | Performed by: ADVANCED PRACTICE MIDWIFE

## 2022-05-13 PROCEDURE — 59426 PR ANTEPARTUM CARE ONLY, >7 VISITS: ICD-10-PCS | Mod: TH,S$GLB,, | Performed by: ADVANCED PRACTICE MIDWIFE

## 2022-05-13 PROCEDURE — 87081 CULTURE SCREEN ONLY: CPT | Performed by: ADVANCED PRACTICE MIDWIFE

## 2022-05-13 PROCEDURE — 87184 SC STD DISK METHOD PER PLATE: CPT | Performed by: ADVANCED PRACTICE MIDWIFE

## 2022-05-13 NOTE — PROGRESS NOTES
2022  22 y.o.  36 + 5/7 weeks.  Estimated Date of Delivery: 22, EDC per patient (US at [facility]). US requested, not received. US in our office at 15 + 3/7 weeks c/w stated EDC.  OB History    Para Term  AB Living   1             SAB IAB Ectopic Multiple Live Births                  # Outcome Date GA Lbr Ron/2nd Weight Sex Delivery Anes PTL Lv   1 Current                Here for scheduled KURTIS visit. Doing well.  No lof/brvb, dysuria, fever/chills, nausea or emesis. No S&S of  COIVD 19 or pre eclampsia. Good FM noted. No cramps/contractions.  Calm, pleasant, NAD. ROS negative with exception of aforementioned:     History  OBXHX:  Insufficient prenatal care  Increased BMI  PMHX:  Denies major illness injury  SURGHX:  Denies  NKDA  MEDS:  None  SOCHX:  Former smoker-quit first trimester  Denies etoh or tobacco use in pregnancy.     LABS:  B pos abs neg  HIV neg  Rubella immune  RPR non reactive  HEP A B C neg  GC CHL neg/neg  Urine cx no growth  CBC 10.8/32.5  Sickle Screen neg  PAP-plan PP  Quad Screen-low risk x 3.  Elevated 1 hour gtt of 155, normal 3 hour gtt.   CBC 10.9/33.5     Review of Systems:  General ROS: negative for headache or visual changes  Breast ROS: negative for breast lumps  Gastrointestinal ROS: negative for constipation, diarrhea or nausea/vomiting  Musculoskeletal ROS: negative for pain in joints or swelling in face or hands.   Neurological ROS: negative for - headaches, numbness/tingling or visual changes    Physical Exam:  /83   Pulse 103   Wt 120.6 kg (265 lb 14.4 oz)   LMP 2021   FHT:   140s  Constitutional: She is oriented to person, place, and time. She appears well-developed and well-nourished. No distress.   Pulmonary/Chest: Effort normal. No respiratory distress  Abdominal: Soft, gravid, nontender. No rebound and no guarding. Fundal Height:  38 cm  Genitourinary: DARSHANA Barclay in room for exam. GBS cx done. SVE closed,  long post and high. VTX leopolds. No blood or fluid on glove.  Musculoskeletal: Normal range of motion. Minimal peripheral edema.   Neurological: She is alert and oriented to person, place, and time. Coordination normal. Gait smooth and steady  Skin: Skin is warm and dry. She is not diaphoretic.  Psychiatric: She has a normal mood and affect.    Assessment:   22 y.o., at 35 + 5/7 weeks Gestation   Patient Active Problem List   Diagnosis    Insufficient prenatal care in second trimester    Increased BMI    Antepartum anemia    Abnormal oral glucose tolerance test    Obesity affecting pregnancy, antepartum    Uterine size date discrepancy pregnancy     Current Outpatient Medications on File Prior to Visit   Medication Sig Dispense Refill    ferrous sulfate 325 (65 FE) MG EC tablet Take 1 tablet (325 mg total) by mouth 2 (two) times daily. 90 tablet 2    PNV,calcium 72-iron-folic acid (PRENATAL VITAMIN PLUS LOW IRON) 27 mg iron- 1 mg Tab Take 1 tablet (1 each total) by mouth once daily.  3     No current facility-administered medications on file prior to visit.       Plan:  1. S&S of PTL/PPROM and pre eclampisa discussed. FMC discussed.   2. Continue daily PNV and Ferrous Sulfate.  3.   Birth plan discussed previously. SR GPT for delivery. Breastfeed, open to all pain management options.  4.   GBS cx done today/discussed.  5.   Growth US and KURTIS 1 week.  6.   Weekly KURTIS until delivery.

## 2022-05-19 LAB — BACTERIA SPEC AEROBE CULT: ABNORMAL

## 2022-05-19 NOTE — PROGRESS NOTES
Please let Ms. Bradshaw know that the GBS culture was positive. We will give her IV antibiotics in labor. I will discuss further with her at her next visit.

## 2022-06-01 ENCOUNTER — ROUTINE PRENATAL (OUTPATIENT)
Dept: OBSTETRICS AND GYNECOLOGY | Facility: CLINIC | Age: 22
End: 2022-06-01
Payer: MEDICAID

## 2022-06-01 VITALS — DIASTOLIC BLOOD PRESSURE: 80 MMHG | SYSTOLIC BLOOD PRESSURE: 127 MMHG | WEIGHT: 270.63 LBS | HEART RATE: 97 BPM

## 2022-06-01 DIAGNOSIS — O99.210 OBESITY AFFECTING PREGNANCY, ANTEPARTUM: Primary | ICD-10-CM

## 2022-06-01 DIAGNOSIS — Z22.330 GBS CARRIER: ICD-10-CM

## 2022-06-01 DIAGNOSIS — Z3A.39 39 WEEKS GESTATION OF PREGNANCY: ICD-10-CM

## 2022-06-01 DIAGNOSIS — O09.32 INSUFFICIENT PRENATAL CARE IN SECOND TRIMESTER: ICD-10-CM

## 2022-06-01 DIAGNOSIS — O99.019 ANTEPARTUM ANEMIA: ICD-10-CM

## 2022-06-01 PROCEDURE — 59426 PR ANTEPARTUM CARE ONLY, >7 VISITS: ICD-10-PCS | Mod: TH,S$GLB,, | Performed by: ADVANCED PRACTICE MIDWIFE

## 2022-06-01 PROCEDURE — 59426 ANTEPARTUM CARE ONLY: CPT | Mod: TH,S$GLB,, | Performed by: ADVANCED PRACTICE MIDWIFE

## 2022-06-01 NOTE — PROGRESS NOTES
2022  22 y.o.  39+ 3/7 weeks.  Estimated Date of Delivery: 22, EDC per patient (US at [facility]). US requested, not received. US in our office at 15 + 3/7 weeks c/w stated EDC.  OB History    Para Term  AB Living   1             SAB IAB Ectopic Multiple Live Births                  # Outcome Date GA Lbr Ron/2nd Weight Sex Delivery Anes PTL Lv   1 Current                Here for scheduled KURTIS visit. Doing well.  No lof/brvb, dysuria, fever/chills, nausea or emesis. No S&S of  COIVD 19 or pre eclampsia. Good FM noted. No cramps/contractions.  Calm, pleasant, NAD. ROS negative with exception of aforementioned:     History  OBXHX:  GBS positive = intrapartum abx  Insufficient prenatal care  Increased BMI  PMHX:  Denies major illness injury  SURGHX:  Denies  NKDA  MEDS:  None  SOCHX:  Former smoker-quit first trimester  Denies etoh or tobacco use in pregnancy.     LABS:  B pos abs neg  HIV neg  Rubella immune  RPR non reactive  HEP A B C neg  GC CHL neg/neg  Urine cx no growth  CBC 10.8/32.5  Sickle Screen neg  PAP-plan PP  Quad Screen-low risk x 3.  Elevated 1 hour gtt of 155, normal 3 hour gtt.   CBC 10.9/33.5   + GBS    Review of Systems:  General ROS: negative for headache or visual changes  Breast ROS: negative for breast lumps  Gastrointestinal ROS: negative for constipation, diarrhea or nausea/vomiting  Musculoskeletal ROS: negative for pain in joints or swelling in face or hands.   Neurological ROS: negative for - headaches, numbness/tingling or visual changes    Physical Exam:  /80   Pulse 97   Wt 122.7 kg (270 lb 9.6 oz)   LMP 2021   FHT:   140s  Constitutional: She is oriented to person, place, and time. She appears well-developed and well-nourished. No distress.   Pulmonary/Chest: Effort normal. No respiratory distress  Abdominal: Soft, gravid, nontender. No rebound and no guarding. Fundal Height:  40   Genitourinary: DARSHANA Barclay in room for  exam. SVE ext os ft, int os closed, long post and high. VTX leopolds. No blood or fluid on glove. + condyloma to perineum and inside of vagina.  Musculoskeletal: Normal range of motion. Minimal peripheral edema.   Neurological: She is alert and oriented to person, place, and time. Coordination normal. Gait smooth and steady  Skin: Skin is warm and dry. She is not diaphoretic.  Psychiatric: She has a normal mood and affect.    Assessment:   22 y.o., at 39 +3/7 weeks Gestation   Patient Active Problem List   Diagnosis    Insufficient prenatal care in second trimester    Increased BMI    Antepartum anemia    Abnormal oral glucose tolerance test    Obesity affecting pregnancy, antepartum    Uterine size date discrepancy pregnancy    GBS carrier    39 weeks gestation of pregnancy     Current Outpatient Medications on File Prior to Visit   Medication Sig Dispense Refill    ferrous sulfate 325 (65 FE) MG EC tablet Take 1 tablet (325 mg total) by mouth 2 (two) times daily. 90 tablet 2    PNV,calcium 72-iron-folic acid (PRENATAL VITAMIN PLUS LOW IRON) 27 mg iron- 1 mg Tab Take 1 tablet (1 each total) by mouth once daily.  3     No current facility-administered medications on file prior to visit.       Plan:  1. S&S of labor/srom and pre eclampisa discussed. FMC discussed.   2. Continue daily PNV and Ferrous Sulfate.  3.   Birth plan discussed previously. SR GPT for delivery. Breastfeed, open to all pain management options.  4.   GBS + discussed and need for intrapartum abx.  5.   Growth US with BPP 6/6/22, post dates. KURTIS 6/6/22. Will discuss IOL if not delivered by next visit.

## 2022-06-06 ENCOUNTER — ROUTINE PRENATAL (OUTPATIENT)
Dept: OBSTETRICS AND GYNECOLOGY | Facility: CLINIC | Age: 22
End: 2022-06-06
Payer: MEDICAID

## 2022-06-06 VITALS
BODY MASS INDEX: 40.88 KG/M2 | SYSTOLIC BLOOD PRESSURE: 138 MMHG | HEIGHT: 69 IN | DIASTOLIC BLOOD PRESSURE: 72 MMHG | HEART RATE: 108 BPM | WEIGHT: 276 LBS

## 2022-06-06 DIAGNOSIS — Z3A.40 40 WEEKS GESTATION OF PREGNANCY: Primary | ICD-10-CM

## 2022-06-06 DIAGNOSIS — Z22.330 GBS CARRIER: ICD-10-CM

## 2022-06-06 DIAGNOSIS — O99.210 OBESITY AFFECTING PREGNANCY, ANTEPARTUM: ICD-10-CM

## 2022-06-06 DIAGNOSIS — O09.32 INSUFFICIENT PRENATAL CARE IN SECOND TRIMESTER: ICD-10-CM

## 2022-06-06 DIAGNOSIS — O99.019 ANTEPARTUM ANEMIA: ICD-10-CM

## 2022-06-06 PROBLEM — O26.849 UTERINE SIZE DATE DISCREPANCY PREGNANCY: Status: RESOLVED | Noted: 2022-05-06 | Resolved: 2022-06-06

## 2022-06-06 PROBLEM — Z3A.39 39 WEEKS GESTATION OF PREGNANCY: Status: RESOLVED | Noted: 2022-06-01 | Resolved: 2022-06-06

## 2022-06-06 PROCEDURE — 59426 PR ANTEPARTUM CARE ONLY, >7 VISITS: ICD-10-PCS | Mod: TH,S$GLB,, | Performed by: ADVANCED PRACTICE MIDWIFE

## 2022-06-06 PROCEDURE — 59426 ANTEPARTUM CARE ONLY: CPT | Mod: TH,S$GLB,, | Performed by: ADVANCED PRACTICE MIDWIFE

## 2022-06-06 NOTE — PROGRESS NOTES
"2022  22 y.o.  39+ 3/7 weeks.  Estimated Date of Delivery: 22, EDC per patient (US at [facility]). US requested, not received. US in our office at 15 + 3/7 weeks c/w stated EDC.  OB History    Para Term  AB Living   1             SAB IAB Ectopic Multiple Live Births                  # Outcome Date GA Lbr Ron/2nd Weight Sex Delivery Anes PTL Lv   1 Current                Here for scheduled KURTIS visit. Doing well.  No lof/brvb, dysuria, fever/chills, nausea or emesis. No S&S of  COIVD 19 or pre eclampsia. Good FM noted. No cramps/contractions.  Calm, pleasant, NAD. ROS negative with exception of aforementioned:     History  OBXHX:  GBS positive = intrapartum abx  Insufficient prenatal care  Increased BMI  PMHX:  Denies major illness injury  SURGHX:  Denies  NKDA  MEDS:  None  SOCHX:  Former smoker-quit first trimester  Denies etoh or tobacco use in pregnancy.     LABS:  B pos abs neg  HIV neg  Rubella immune  RPR non reactive  HEP A B C neg  GC CHL neg/neg  Urine cx no growth  CBC 10.8/32.5  Sickle Screen neg  PAP-plan PP  Quad Screen-low risk x 3.  Elevated 1 hour gtt of 155, normal 3 hour gtt.   CBC 10.9/33.5   + GBS    Review of Systems:  General ROS: negative for headache or visual changes  Breast ROS: negative for breast lumps  Gastrointestinal ROS: negative for constipation, diarrhea or nausea/vomiting  Musculoskeletal ROS: negative for pain in joints or swelling in face or hands.   Neurological ROS: negative for - headaches, numbness/tingling or visual changes    Physical Exam:  /72   Pulse 108   Ht 5' 9" (1.753 m)   Wt 125.2 kg (276 lb)   LMP 2021   BMI 40.76 kg/m²   FHT:   140s  Constitutional: She is oriented to person, place, and time. She appears well-developed and well-nourished. No distress.   Pulmonary/Chest: Effort normal. No respiratory distress  Abdominal: Soft, gravid, nontender. No rebound and no guarding. Fundal Height:  40 "   Genitourinary: DARSHANA Barclay in room for exam. SVE ext loose 1 cm,  int os closed, long post -3. VTX leopolds. No blood or fluid on glove. + condyloma to perineum and inside of vagina.  Musculoskeletal: Normal range of motion. Minimal peripheral edema.   Neurological: She is alert and oriented to person, place, and time. Coordination normal. Gait smooth and steady  Skin: Skin is warm and dry. She is not diaphoretic.  Psychiatric: She has a normal mood and affect.    Assessment:   22 y.o., at 40 +1/7 weeks Gestation   Patient Active Problem List   Diagnosis    Insufficient prenatal care in second trimester    Increased BMI    Antepartum anemia    Abnormal oral glucose tolerance test    Obesity affecting pregnancy, antepartum    GBS carrier    40 weeks gestation of pregnancy    BMI 40.0-44.9, adult     Current Outpatient Medications on File Prior to Visit   Medication Sig Dispense Refill    ferrous sulfate 325 (65 FE) MG EC tablet Take 1 tablet (325 mg total) by mouth 2 (two) times daily. 90 tablet 2    PNV,calcium 72-iron-folic acid (PRENATAL VITAMIN PLUS LOW IRON) 27 mg iron- 1 mg Tab Take 1 tablet (1 each total) by mouth once daily.  3     No current facility-administered medications on file prior to visit.       Plan:  1. S&S of labor/srom and pre eclampisa discussed. Strict FMC discussed.   2. Continue daily PNV and Ferrous Sulfate.  3.   [facility] Breastfeed, open to all pain management options.  4.   GBS + discussed and need for intrapartum abx.  5.   BPP this week, morbid obesity.   6.   IOL scheduled for 6/12/22 at  GPT 41 weeks with Cytotec if does not go into labor on her own beforehand. Risks, benefits, process discussed. All questions answered.

## 2022-06-07 ENCOUNTER — HOSPITAL ENCOUNTER (OUTPATIENT)
Dept: MATERNAL FETAL MEDICINE | Facility: CLINIC | Age: 22
Discharge: HOME | End: 2022-06-07
Payer: MEDICAID

## 2022-06-07 DIAGNOSIS — Z3A.40 40 WEEKS GESTATION OF PREGNANCY: ICD-10-CM

## 2022-06-07 DIAGNOSIS — O99.210 OBESITY AFFECTING PREGNANCY, ANTEPARTUM: ICD-10-CM

## 2022-06-07 PROCEDURE — 76816 OB US FOLLOW-UP PER FETUS: CPT | Mod: S$GLB,,, | Performed by: OBSTETRICS & GYNECOLOGY

## 2022-06-07 PROCEDURE — 76819 FETAL BIOPHYS PROFIL W/O NST: CPT | Mod: S$GLB,,, | Performed by: OBSTETRICS & GYNECOLOGY

## 2022-06-07 PROCEDURE — 76819 US OB/GYN PROCEDURE (VIEWPOINT): ICD-10-PCS | Mod: S$GLB,,, | Performed by: OBSTETRICS & GYNECOLOGY

## 2022-06-07 PROCEDURE — 76816 US OB/GYN PROCEDURE (VIEWPOINT): ICD-10-PCS | Mod: S$GLB,,, | Performed by: OBSTETRICS & GYNECOLOGY

## 2022-06-13 ENCOUNTER — OUTSIDE PLACE OF SERVICE (OUTPATIENT)
Dept: OBSTETRICS AND GYNECOLOGY | Facility: CLINIC | Age: 22
End: 2022-06-13
Payer: MEDICAID

## 2022-06-13 PROCEDURE — 59409 OBSTETRICAL CARE: CPT | Mod: TH,,, | Performed by: ADVANCED PRACTICE MIDWIFE

## 2022-06-13 PROCEDURE — 59409 PR OBSTETRICAL CARE,VAG DELIV ONLY: ICD-10-PCS | Mod: TH,,, | Performed by: ADVANCED PRACTICE MIDWIFE

## 2022-06-24 ENCOUNTER — TELEPHONE (OUTPATIENT)
Dept: OBSTETRICS AND GYNECOLOGY | Facility: CLINIC | Age: 22
End: 2022-06-24
Payer: MEDICAID

## 2023-02-22 ENCOUNTER — OFFICE VISIT (OUTPATIENT)
Dept: OBSTETRICS AND GYNECOLOGY | Facility: CLINIC | Age: 23
End: 2023-02-22
Payer: MEDICAID

## 2023-02-22 ENCOUNTER — LAB VISIT (OUTPATIENT)
Dept: LAB | Facility: CLINIC | Age: 23
End: 2023-02-22
Payer: MEDICAID

## 2023-02-22 VITALS
SYSTOLIC BLOOD PRESSURE: 132 MMHG | WEIGHT: 293 LBS | HEART RATE: 100 BPM | BODY MASS INDEX: 41.95 KG/M2 | HEIGHT: 70 IN | DIASTOLIC BLOOD PRESSURE: 70 MMHG

## 2023-02-22 DIAGNOSIS — Z01.419 ENCOUNTER FOR ANNUAL ROUTINE GYNECOLOGICAL EXAMINATION: Primary | ICD-10-CM

## 2023-02-22 DIAGNOSIS — N92.6 IRREGULAR MENSES: ICD-10-CM

## 2023-02-22 DIAGNOSIS — Z01.419 ENCOUNTER FOR ANNUAL ROUTINE GYNECOLOGICAL EXAMINATION: ICD-10-CM

## 2023-02-22 DIAGNOSIS — A63.0 VAGINAL VENEREAL WARTS: ICD-10-CM

## 2023-02-22 DIAGNOSIS — E66.01 MORBID OBESITY: ICD-10-CM

## 2023-02-22 PROBLEM — Z22.330 GBS CARRIER: Status: RESOLVED | Noted: 2022-06-01 | Resolved: 2023-02-22

## 2023-02-22 PROBLEM — Z3A.40 40 WEEKS GESTATION OF PREGNANCY: Status: RESOLVED | Noted: 2022-06-06 | Resolved: 2023-02-22

## 2023-02-22 PROBLEM — O99.019 ANTEPARTUM ANEMIA: Status: RESOLVED | Noted: 2022-04-14 | Resolved: 2023-02-22

## 2023-02-22 PROBLEM — O09.32 INSUFFICIENT PRENATAL CARE IN SECOND TRIMESTER: Status: RESOLVED | Noted: 2021-12-15 | Resolved: 2023-02-22

## 2023-02-22 PROBLEM — R73.09 ABNORMAL ORAL GLUCOSE TOLERANCE TEST: Status: RESOLVED | Noted: 2022-05-06 | Resolved: 2023-02-22

## 2023-02-22 PROBLEM — O99.210 OBESITY AFFECTING PREGNANCY, ANTEPARTUM: Status: RESOLVED | Noted: 2022-05-06 | Resolved: 2023-02-22

## 2023-02-22 PROBLEM — R63.8 INCREASED BMI: Status: RESOLVED | Noted: 2021-12-15 | Resolved: 2023-02-22

## 2023-02-22 LAB
CHOLEST SERPL-MCNC: 174 MG/DL (ref 120–199)
CHOLEST/HDLC SERPL: 4.1 {RATIO} (ref 2–5)
ESTIMATED AVG GLUCOSE: 114 MG/DL (ref 68–131)
HBA1C MFR BLD: 5.6 % (ref 4–5.6)
HCG INTACT+B SERPL-ACNC: <1.2 MIU/ML
HDLC SERPL-MCNC: 42 MG/DL (ref 40–75)
HDLC SERPL: 24.1 % (ref 20–50)
LDLC SERPL CALC-MCNC: 114.8 MG/DL (ref 63–159)
NONHDLC SERPL-MCNC: 132 MG/DL
TRIGL SERPL-MCNC: 86 MG/DL (ref 30–150)

## 2023-02-22 PROCEDURE — 1159F PR MEDICATION LIST DOCUMENTED IN MEDICAL RECORD: ICD-10-PCS | Mod: CPTII,S$GLB,, | Performed by: ADVANCED PRACTICE MIDWIFE

## 2023-02-22 PROCEDURE — 88141 PR  CYTOPATH CERV/VAG INTERPRET: ICD-10-PCS | Mod: ,,, | Performed by: PATHOLOGY

## 2023-02-22 PROCEDURE — 87591 N.GONORRHOEAE DNA AMP PROB: CPT | Performed by: ADVANCED PRACTICE MIDWIFE

## 2023-02-22 PROCEDURE — 3008F PR BODY MASS INDEX (BMI) DOCUMENTED: ICD-10-PCS | Mod: CPTII,S$GLB,, | Performed by: ADVANCED PRACTICE MIDWIFE

## 2023-02-22 PROCEDURE — 99395 PR PREVENTIVE VISIT,EST,18-39: ICD-10-PCS | Mod: S$GLB,,, | Performed by: ADVANCED PRACTICE MIDWIFE

## 2023-02-22 PROCEDURE — 3008F BODY MASS INDEX DOCD: CPT | Mod: CPTII,S$GLB,, | Performed by: ADVANCED PRACTICE MIDWIFE

## 2023-02-22 PROCEDURE — 84702 CHORIONIC GONADOTROPIN TEST: CPT | Performed by: ADVANCED PRACTICE MIDWIFE

## 2023-02-22 PROCEDURE — 80061 LIPID PANEL: CPT | Performed by: ADVANCED PRACTICE MIDWIFE

## 2023-02-22 PROCEDURE — 87624 HPV HI-RISK TYP POOLED RSLT: CPT | Performed by: ADVANCED PRACTICE MIDWIFE

## 2023-02-22 PROCEDURE — 3075F SYST BP GE 130 - 139MM HG: CPT | Mod: CPTII,S$GLB,, | Performed by: ADVANCED PRACTICE MIDWIFE

## 2023-02-22 PROCEDURE — 83036 HEMOGLOBIN GLYCOSYLATED A1C: CPT | Performed by: ADVANCED PRACTICE MIDWIFE

## 2023-02-22 PROCEDURE — 36415 COLL VENOUS BLD VENIPUNCTURE: CPT | Mod: ,,, | Performed by: STUDENT IN AN ORGANIZED HEALTH CARE EDUCATION/TRAINING PROGRAM

## 2023-02-22 PROCEDURE — 3075F PR MOST RECENT SYSTOLIC BLOOD PRESS GE 130-139MM HG: ICD-10-PCS | Mod: CPTII,S$GLB,, | Performed by: ADVANCED PRACTICE MIDWIFE

## 2023-02-22 PROCEDURE — 88141 CYTOPATH C/V INTERPRET: CPT | Mod: ,,, | Performed by: PATHOLOGY

## 2023-02-22 PROCEDURE — 1159F MED LIST DOCD IN RCRD: CPT | Mod: CPTII,S$GLB,, | Performed by: ADVANCED PRACTICE MIDWIFE

## 2023-02-22 PROCEDURE — 3078F PR MOST RECENT DIASTOLIC BLOOD PRESSURE < 80 MM HG: ICD-10-PCS | Mod: CPTII,S$GLB,, | Performed by: ADVANCED PRACTICE MIDWIFE

## 2023-02-22 PROCEDURE — 3078F DIAST BP <80 MM HG: CPT | Mod: CPTII,S$GLB,, | Performed by: ADVANCED PRACTICE MIDWIFE

## 2023-02-22 PROCEDURE — 99395 PREV VISIT EST AGE 18-39: CPT | Mod: S$GLB,,, | Performed by: ADVANCED PRACTICE MIDWIFE

## 2023-02-22 PROCEDURE — 36415 PR COLLECTION VENOUS BLOOD,VENIPUNCTURE: ICD-10-PCS | Mod: ,,, | Performed by: STUDENT IN AN ORGANIZED HEALTH CARE EDUCATION/TRAINING PROGRAM

## 2023-02-22 NOTE — PROGRESS NOTES
CC: Well woman exam    Polly Bradshaw is a 23 y.o. female  presents for well woman exam.  LMP, unsure. Menses do not occur regularly. Known to this provider from pregnancy and  last year. Did not f/u for postpartum exam.  No issues, problems, or complaints. Patients last pap was never  Last wellness labs were done never. She is a non smoker . Denies any anxiety and depression. She uses a seat belt while riding in the car. Does not exercise regularly or eat a balanced diet. She is sexually active.The current method of family planning is condoms.Does not desire other contraception.    History  OBXHX: live infant female, 21.  PMHX:  Denies major illness injury  Morbid Obesity  SURGHX:  Denies  NKDA  MEDS:  None  SOCHX:  Former smoker  Denies etoh or tobacco use in pregnancy.       Past Medical History:   Diagnosis Date    Anemia      History reviewed. No pertinent surgical history.  Social History     Socioeconomic History    Marital status: Single   Tobacco Use    Smoking status: Former     Packs/day: 1.00     Types: Cigarettes    Smokeless tobacco: Never   Substance and Sexual Activity    Alcohol use: Not Currently    Drug use: Never    Sexual activity: Yes     Partners: Male     Birth control/protection: Condom     History reviewed. No pertinent family history.  OB History          1    Para        Term                AB        Living   1         SAB        IAB        Ectopic        Multiple        Live Births   1               Current Outpatient Medications on File Prior to Visit   Medication Sig Dispense Refill    ferrous sulfate 325 (65 FE) MG EC tablet Take 1 tablet (325 mg total) by mouth 2 (two) times daily. (Patient not taking: Reported on 2023) 90 tablet 2    PNV,calcium 72-iron-folic acid (PRENATAL VITAMIN PLUS LOW IRON) 27 mg iron- 1 mg Tab Take 1 tablet (1 each total) by mouth once daily.  3     No current facility-administered medications on file prior to  "visit.        ROS:  Review of Systems   All other systems reviewed and are negative.     /70 (BP Location: Left arm, Patient Position: Sitting)   Pulse 100   Ht 5' 10" (1.778 m)   Wt 134.3 kg (296 lb 1.6 oz)   LMP  (LMP Unknown)   Breastfeeding No   BMI 42.49 kg/m²     PHYSICAL EXAM:  Physical Exam:   Constitutional: She appears well-developed and well-nourished.    HENT:   Head: Normocephalic.    Eyes: Pupils are equal, round, and reactive to light. Right eye exhibits no discharge. Left eye exhibits no discharge.    Neck: No tracheal deviation present. No thyromegaly present.    Cardiovascular:  Normal rate, regular rhythm and normal heart sounds.      Exam reveals no gallop, no friction rub, no clubbing, no cyanosis and no edema.       No murmur heard.   Pulmonary/Chest: Effort normal and breath sounds normal. No respiratory distress. She has no wheezes. She has no rales. She exhibits no tenderness.        Abdominal: Soft. Bowel sounds are normal. Hernia confirmed negative in the right inguinal area and confirmed negative in the left inguinal area.     Genitourinary:    Inguinal canal, vagina and uterus normal.         The external female genitalia was normal.   No external genitalia lesions identified,Genitalia hair distrobution normal .   Labial bartholins normal.Labial bartholins abnormal.There is no rash, tenderness, lesion or injury on the right labia. There is no rash, tenderness, lesion or injury on the left labia. No  no vaginal discharge in the vagina.           Musculoskeletal: Normal range of motion and moves all extremeties. No tenderness or edema.      Lymphadenopathy: No inguinal adenopathy noted on the right or left side.    Neurological: She displays normal reflexes. No cranial nerve deficit. She exhibits normal muscle tone. Coordination normal.    Skin: Skin is warm and dry. No rash noted. No cyanosis or erythema. No pallor. Nails show no clubbing.    Psychiatric: She has a normal mood " and affect. Her behavior is normal. Judgment and thought content normal.      Encounter for annual routine gynecological examination  -     HIV 1/2 Ag/Ab (4th Gen); Future; Expected date: 02/22/2023  -     RPR; Future; Expected date: 02/22/2023  -     Hemoglobin A1C; Future; Expected date: 02/22/2023  -     Hepatitis panel, acute; Future; Expected date: 02/22/2023  -     Lipid panel; Future; Expected date: 02/22/2023  -     Liquid-Based Pap Smear, Screening  -     HPV High Risk Genotypes, PCR  -     C. trachomatis/N. gonorrhoeae by AMP DNA [facility]; Cervix    Irregular menses  -     HCG, Quantitative; Future; Expected date: 02/22/2023    Morbid obesity      Orders Placed This Encounter   Procedures    HPV High Risk Genotypes, PCR     Order Specific Question:   Source     Answer:   Cervix     Order Specific Question:   Release to patient     Answer:   Immediate    C. trachomatis/N. gonorrhoeae by AMP DNA [facility]; Cervix     Order Specific Question:   Sources by Resulting Lab:     Answer:   [facility]     Order Specific Question:   Source:     Answer:   Cervix    HIV 1/2 Ag/Ab (4th Gen)     Standing Status:   Future     Standing Expiration Date:   4/22/2024     Order Specific Question:   Release to patient     Answer:   Immediate    RPR     Standing Status:   Future     Standing Expiration Date:   4/22/2024     Order Specific Question:   Release to patient     Answer:   Immediate    Hemoglobin A1C     Standing Status:   Future     Standing Expiration Date:   4/22/2024    Hepatitis panel, acute     Standing Status:   Future     Standing Expiration Date:   4/22/2024    Lipid panel     Standing Status:   Future     Standing Expiration Date:   4/22/2024    HCG, Quantitative     Standing Status:   Future     Standing Expiration Date:   4/22/2024     Order Specific Question:   Release to patient     Answer:   Immediate        Patient was counseled today on A.C.S. Pap guidelines and recommendations for yearly pelvic  exams, self breast exams; to see her PCP for other health maintenance.  Contraception was discussed with the patient she expressed understanding.  STD education counseling was performed during this visit patient expressed understanding.  Wellness labs today along with PAP and GC/CHL done plus quant hcg. Does not feel she may be pregnant.   We discussed irregular menses may be related to obesity. Recommend begin walking for exercise and try to make better food choices.  Lab results and communication planned via [facility] patient portal.  See PCM for non gyn needs.    No follow-ups on file.

## 2023-02-23 LAB
C TRACH DNA SPEC QL NAA+PROBE: NOT DETECTED
HAV IGM SERPL QL IA: NORMAL
HBV CORE IGM SERPL QL IA: NORMAL
HBV SURFACE AG SERPL QL IA: NORMAL
HCV AB SERPL QL IA: NORMAL
HIV 1+2 AB+HIV1 P24 AG SERPL QL IA: NORMAL
N GONORRHOEA DNA SPEC QL NAA+PROBE: DETECTED
RPR SER QL: REACTIVE
RPR SER-TITR: ABNORMAL {TITER}

## 2023-02-24 LAB — T PALLIDUM AB SER QL IF: REACTIVE

## 2023-02-27 ENCOUNTER — PATIENT MESSAGE (OUTPATIENT)
Dept: OBSTETRICS AND GYNECOLOGY | Facility: CLINIC | Age: 23
End: 2023-02-27
Payer: MEDICAID

## 2023-02-27 DIAGNOSIS — A54.9 GONORRHEA: Primary | ICD-10-CM

## 2023-02-27 RX ORDER — CEFTRIAXONE 500 MG/1
500 INJECTION, POWDER, FOR SOLUTION INTRAMUSCULAR; INTRAVENOUS ONCE
Status: DISCONTINUED | OUTPATIENT
Start: 2023-02-27 | End: 2023-03-08

## 2023-02-28 LAB
FINAL PATHOLOGIC DIAGNOSIS: ABNORMAL
Lab: ABNORMAL

## 2023-03-01 LAB
HPV HR 12 DNA SPEC QL NAA+PROBE: NEGATIVE
HPV16 AG SPEC QL: NEGATIVE
HPV18 DNA SPEC QL NAA+PROBE: NEGATIVE

## 2023-03-03 NOTE — PROGRESS NOTES
Anupama,  I have not been able to get in contact with this patient by phone or [facility] portal. Please call her and schedule an appointment to come in and see me to discuss lab results ASAP.

## 2023-03-07 ENCOUNTER — TELEPHONE (OUTPATIENT)
Dept: OBSTETRICS AND GYNECOLOGY | Facility: CLINIC | Age: 23
End: 2023-03-07
Payer: MEDICAID

## 2023-03-07 NOTE — TELEPHONE ENCOUNTER
----- Message from Steph Krueger CNM sent at 3/3/2023 11:45 AM CST -----  Anupama,  I have not been able to get in contact with this patient by phone or [facility] portal. Please call her and schedule an appointment to come in and see me to discuss lab results ASAP.

## 2023-03-08 ENCOUNTER — OFFICE VISIT (OUTPATIENT)
Dept: OBSTETRICS AND GYNECOLOGY | Facility: CLINIC | Age: 23
End: 2023-03-08
Payer: MEDICAID

## 2023-03-08 DIAGNOSIS — A53.9 SYPHILIS: ICD-10-CM

## 2023-03-08 DIAGNOSIS — A54.9 GONORRHEA: Primary | ICD-10-CM

## 2023-03-08 DIAGNOSIS — R87.612 LGSIL ON PAP SMEAR OF CERVIX: ICD-10-CM

## 2023-03-08 PROCEDURE — 3044F HG A1C LEVEL LT 7.0%: CPT | Mod: CPTII,S$GLB,, | Performed by: ADVANCED PRACTICE MIDWIFE

## 2023-03-08 PROCEDURE — 3044F PR MOST RECENT HEMOGLOBIN A1C LEVEL <7.0%: ICD-10-PCS | Mod: CPTII,S$GLB,, | Performed by: ADVANCED PRACTICE MIDWIFE

## 2023-03-08 PROCEDURE — 99213 OFFICE O/P EST LOW 20 MIN: CPT | Mod: 25,S$GLB,, | Performed by: ADVANCED PRACTICE MIDWIFE

## 2023-03-08 PROCEDURE — 96372 PR INJECTION,THERAP/PROPH/DIAG2ST, IM OR SUBCUT: ICD-10-PCS | Mod: S$GLB,,, | Performed by: STUDENT IN AN ORGANIZED HEALTH CARE EDUCATION/TRAINING PROGRAM

## 2023-03-08 PROCEDURE — 99213 PR OFFICE/OUTPT VISIT, EST, LEVL III, 20-29 MIN: ICD-10-PCS | Mod: 25,S$GLB,, | Performed by: ADVANCED PRACTICE MIDWIFE

## 2023-03-08 PROCEDURE — 96372 THER/PROPH/DIAG INJ SC/IM: CPT | Mod: S$GLB,,, | Performed by: STUDENT IN AN ORGANIZED HEALTH CARE EDUCATION/TRAINING PROGRAM

## 2023-03-08 RX ORDER — CEFTRIAXONE 500 MG/1
500 INJECTION, POWDER, FOR SOLUTION INTRAMUSCULAR; INTRAVENOUS
Status: COMPLETED | OUTPATIENT
Start: 2023-03-08 | End: 2023-03-08

## 2023-03-08 RX ADMIN — CEFTRIAXONE 500 MG: 500 INJECTION, POWDER, FOR SOLUTION INTRAMUSCULAR; INTRAVENOUS at 10:03

## 2023-03-08 NOTE — PROGRESS NOTES
Polly Bradshaw is a 23 y.o. female  presents for discussion of lab results. Seen recently by this provider for well woman exam. LSIL PAP with neg HPV DNA, + Gonorrhea, neg Chlamydia and + Syphilis. + FTA ABS, reactive RPR, Titer of 1:64.   Negative GC CHL, 2021, RPR non reactive 2021    History  OBXHX: live infant female, 21.  PMHX:  Denies major illness injury  Morbid Obesity  SURGHX:  Denies  NKDA  MEDS:  None  SOCHX:  Former smoker  Denies etoh or tobacco use in pregnancy.       Past Medical History:   Diagnosis Date    Anemia      No past surgical history on file.  Social History     Socioeconomic History    Marital status: Single   Tobacco Use    Smoking status: Former     Packs/day: 1.00     Types: Cigarettes    Smokeless tobacco: Never   Substance and Sexual Activity    Alcohol use: Not Currently    Drug use: Never    Sexual activity: Yes     Partners: Male     Birth control/protection: Condom     No family history on file.  OB History          1    Para        Term                AB        Living   1         SAB        IAB        Ectopic        Multiple        Live Births   1               Current Outpatient Medications on File Prior to Visit   Medication Sig Dispense Refill    ferrous sulfate 325 (65 FE) MG EC tablet Take 1 tablet (325 mg total) by mouth 2 (two) times daily. (Patient not taking: Reported on 2023) 90 tablet 2    PNV,calcium 72-iron-folic acid (PRENATAL VITAMIN PLUS LOW IRON) 27 mg iron- 1 mg Tab Take 1 tablet (1 each total) by mouth once daily.  3     Current Facility-Administered Medications on File Prior to Visit   Medication Dose Route Frequency Provider Last Rate Last Admin    [DISCONTINUED] cefTRIAXone injection 500 mg  500 mg Intramuscular Once Steph Krueger CNM            PHYSICAL EXAM:  Physical exam not indicated     Gonorrhea    Syphilis    LGSIL on Pap smear of cervix    Other orders  -     cefTRIAXone injection 500 mg      No  orders of the defined types were placed in this encounter.       We discussed Syphilis, Gonorrhea and LSIL PAP with neg HPV in detail. I discussed over the phone at her request with her sister as well.  Rocephin 500 mg IM today.  PAP to be repeated in 12 months with HPV screening  Copy of labs given. Recommended [facility] or [facility] for treatment of Syphilis. We discussed need for any sexual contacts to be notified and treated by their provider for Gonorrhea and Syphilis.  She can follow up in 3-4 weeks for test of cure for Gonorrhea here with our practice or with [facility].  Repeat PAP in 12 months as discussed. LSIL

## 2023-03-08 NOTE — PROGRESS NOTES
Patient presents for scheduled ceftriaxone (Rocephin) injection.  Ceftriaxone 500 mg given IM per provider order.  See MAR for further details.  Patient tolerated well.  No signs or symptoms of adverse reaction after 15 minutes.  Patient instructed to return in 4 weeks for test of cure.

## 2023-07-11 ENCOUNTER — PATIENT MESSAGE (OUTPATIENT)
Dept: RESEARCH | Facility: HOSPITAL | Age: 23
End: 2023-07-11
Payer: MEDICAID

## 2025-07-09 ENCOUNTER — PATIENT OUTREACH (OUTPATIENT)
Dept: ADMINISTRATIVE | Facility: HOSPITAL | Age: 25
End: 2025-07-09